# Patient Record
Sex: MALE | Race: AMERICAN INDIAN OR ALASKA NATIVE
[De-identification: names, ages, dates, MRNs, and addresses within clinical notes are randomized per-mention and may not be internally consistent; named-entity substitution may affect disease eponyms.]

---

## 2018-09-16 ENCOUNTER — HOSPITAL ENCOUNTER (EMERGENCY)
Dept: HOSPITAL 5 - ED | Age: 53
LOS: 4 days | Discharge: PSYCHIATRIC OR SUBSTANCE USE FACILITY | End: 2018-09-20
Payer: SELF-PAY

## 2018-09-16 DIAGNOSIS — F32.9: Primary | ICD-10-CM

## 2018-09-16 DIAGNOSIS — F14.10: ICD-10-CM

## 2018-09-16 LAB
BACTERIA #/AREA URNS HPF: (no result) /HPF
BASOPHILS # (AUTO): 0.2 K/MM3 (ref 0–0.1)
BASOPHILS NFR BLD AUTO: 2.8 % (ref 0–1.8)
BENZODIAZEPINES SCREEN,URINE: (no result)
BILIRUB UR QL STRIP: (no result)
BLOOD UR QL VISUAL: (no result)
BUN SERPL-MCNC: 10 MG/DL (ref 9–20)
BUN/CREAT SERPL: 8 %
CALCIUM SERPL-MCNC: 9.8 MG/DL (ref 8.4–10.2)
EOSINOPHIL # BLD AUTO: 0.2 K/MM3 (ref 0–0.4)
EOSINOPHIL NFR BLD AUTO: 2.5 % (ref 0–4.3)
HCT VFR BLD CALC: 41.5 % (ref 35.5–45.6)
HEMOLYSIS INDEX: 36
HGB BLD-MCNC: 14.6 GM/DL (ref 11.8–15.2)
LYMPHOCYTES # BLD AUTO: 2.4 K/MM3 (ref 1.2–5.4)
LYMPHOCYTES NFR BLD AUTO: 31.8 % (ref 13.4–35)
MCH RBC QN AUTO: 33 PG (ref 28–32)
MCHC RBC AUTO-ENTMCNC: 35 % (ref 32–34)
MCV RBC AUTO: 95 FL (ref 84–94)
METHADONE SCREEN,URINE: (no result)
MONOCYTES # (AUTO): 0.7 K/MM3 (ref 0–0.8)
MONOCYTES % (AUTO): 9.5 % (ref 0–7.3)
MUCOUS THREADS #/AREA URNS HPF: (no result) /HPF
OPIATE SCREEN,URINE: (no result)
PH UR STRIP: 5 [PH] (ref 5–7)
PLATELET # BLD: 438 K/MM3 (ref 140–440)
RBC # BLD AUTO: 4.38 M/MM3 (ref 3.65–5.03)
RBC #/AREA URNS HPF: 1 /HPF (ref 0–6)
UROBILINOGEN UR-MCNC: 4 MG/DL (ref ?–2)
WBC #/AREA URNS HPF: 1 /HPF (ref 0–6)

## 2018-09-16 PROCEDURE — 80307 DRUG TEST PRSMV CHEM ANLYZR: CPT

## 2018-09-16 PROCEDURE — 81001 URINALYSIS AUTO W/SCOPE: CPT

## 2018-09-16 PROCEDURE — 80048 BASIC METABOLIC PNL TOTAL CA: CPT

## 2018-09-16 PROCEDURE — G0480 DRUG TEST DEF 1-7 CLASSES: HCPCS

## 2018-09-16 PROCEDURE — 85025 COMPLETE CBC W/AUTO DIFF WBC: CPT

## 2018-09-16 PROCEDURE — 80320 DRUG SCREEN QUANTALCOHOLS: CPT

## 2018-09-16 PROCEDURE — 36415 COLL VENOUS BLD VENIPUNCTURE: CPT

## 2018-09-16 PROCEDURE — 99285 EMERGENCY DEPT VISIT HI MDM: CPT

## 2018-09-16 NOTE — EMERGENCY DEPARTMENT REPORT
ED Psych HPI





- General


Chief Complaint: Psych


Stated Complaint: MH


Time Seen by Provider: 09/16/18 11:20


Source: patient


Mode of arrival: Ambulatory


Limitations: No Limitations





- History of Present Illness


Initial Comments: 


Patient is a 52-year-old male presents emergency room with complaints of 

suicidal ideation with plan.  Patient states his plan is to cut himself in 

order to die.  Patient states his suicidal ideations have been going on for a 

week and worsening.  Patient states he started using drugs on 09/15/2018.  

Patient states she's been feeling depressed for a week.  Patient states 

depression is worsening.  Patient reports that he is having audio 

hallucinations times one week that are telling him to kill himself.  Patient 

denies homicidal ideations and visual hallucinations.  Patient denies physical 

complaints except for being hungry.  Patient denies chest pain shortness of 

breath.  Patient denies abdominal pain.  Patient denies fever or chills.





MD Complaint: suicidal ideation, feels depressed


-: Sudden


Associated Psychiatric Symptoms: depression, suicidal ideation, auditory 

hallucinations


History of same: Yes


Quality: constant


Improves With: none


Worsens With: none


Context: recent drug abuse, significant life stressor


Associated Symptoms: denies: confusion, headache, shortness of breath, nausea, 

vomiting, syncope, insomnia


Treatments Prior to Arrival: placed on mental he


If Self Harm: admits thoughts of, has plan





- Related Data


 Previous Rx's











 Medication  Instructions  Recorded  Last Taken  Type


 


Ketorolac [Toradol] 10 mg PO Q6H PRN #20 tablet 07/19/18 Unknown Rx


 


Ondansetron [Zofran Odt] 4 mg PO Q8HR PRN #14 tab.rapdis 07/19/18 Unknown Rx


 


traMADol [Ultram 50 MG tab] 50 mg PO Q4HR PRN #14 tablet 07/19/18 Unknown Rx











 Allergies











Allergy/AdvReac Type Severity Reaction Status Date / Time


 


No Known Allergies Allergy   Verified 07/19/18 11:41














ED Review of Systems


ROS: 


Stated complaint: MH


Other details as noted in HPI





Constitutional: denies: chills, fever


Eyes: denies: eye pain, eye discharge, vision change


ENT: denies: ear pain, throat pain


Respiratory: denies: cough, shortness of breath, wheezing


Cardiovascular: denies: chest pain, palpitations


Endocrine: no symptoms reported


Gastrointestinal: denies: abdominal pain, nausea, diarrhea


Genitourinary: denies: urgency, dysuria


Musculoskeletal: denies: back pain, joint swelling, arthralgia


Skin: denies: rash, lesions


Neurological: denies: headache, weakness, paresthesias


Psychiatric: depression, auditory hallucinations, suicidal thoughts.  denies: 

anxiety, visual hallucinations, homicidal thoughts


Hematological/Lymphatic: denies: easy bleeding, easy bruising





ED Past Medical Hx





- Past Medical History


Previous Medical History?: Yes


Hx Hypertension: Yes


Hx Heart Attack/AMI: Yes (2008,2009)


Hx Psychiatric Treatment: Yes (SI in late 90s)





- Surgical History


Past Surgical History?: Yes


Additional Surgical History: stent placement





- Family History


Family history: no significant





- Social History


Smoking Status: Current Every Day Smoker


Substance Use Type: Alcohol, Marijuana, Other





- Medications


Home Medications: 


 Home Medications











 Medication  Instructions  Recorded  Confirmed  Last Taken  Type


 


Ketorolac [Toradol] 10 mg PO Q6H PRN #20 tablet 07/19/18  Unknown Rx


 


Ondansetron [Zofran Odt] 4 mg PO Q8HR PRN #14 tab.rapdis 07/19/18  Unknown Rx


 


traMADol [Ultram 50 MG tab] 50 mg PO Q4HR PRN #14 tablet 07/19/18  Unknown Rx














ED Physical Exam





- General


Limitations: No Limitations


General appearance: alert, in no apparent distress





- Head


Head exam: Present: atraumatic, normocephalic





- Eye


Eye exam: Present: normal appearance





- ENT


ENT exam: Present: mucous membranes moist





- Neck


Neck exam: Present: normal inspection





- Respiratory


Respiratory exam: Present: normal lung sounds bilaterally.  Absent: respiratory 

distress





- Cardiovascular


Cardiovascular Exam: Present: regular rate, normal rhythm.  Absent: systolic 

murmur, diastolic murmur, rubs, gallop





- GI/Abdominal


GI/Abdominal exam: Present: soft, normal bowel sounds





- Rectal


Rectal exam: Present: deferred





- Extremities Exam


Extremities exam: Present: normal inspection





- Back Exam


Back exam: Present: normal inspection





- Neurological Exam


Neurological exam: Present: alert, oriented X3





- Psychiatric


Psychiatric exam: Present: depressed, suicidal ideation





- Skin


Skin exam: Present: warm, dry, intact, normal color.  Absent: rash





ED Course


 Vital Signs











  09/16/18





  02:49


 


Temperature 98.1 F


 


Pulse Rate 90


 


Respiratory 16





Rate 


 


Blood Pressure 124/92


 


O2 Sat by Pulse 98





Oximetry 














- Reevaluation(s)


Reevaluation #1: 


Patient was placed on a 1013 due to suicidal ideation with a plan.  Patient 

will remain on a psychiatric hold and medically cleared once all results are 

available.  Mental health evaluation was ordered.


09/16/18 11:30





Patient is medically cleared and patient will remain on 1013 until accepted to 

a psychiatric facility.  Plan of care and labs discussed with patient


09/16/18 15:10








ED Medical Decision Making





- Lab Data


Result diagrams: 


 09/16/18 04:37





 09/16/18 04:37





- Medical Decision Making


Patient is a 52-year-old male that presents emergency room with complaints of 

suicidal ideation with a plan.  She was placed on a 1013 immediately and will 

remain on 1013 until accepted into appropriate psychiatric facility.  Patient 

is medically cleared.








- Differential Diagnosis


DEPRESSION. SI. 


Critical care attestation.: 


If time is entered above; I have spent that time in minutes in the direct care 

of this critically ill patient, excluding procedure time.








ED Disposition


Clinical Impression: 


 Suicidal ideations, Hallucination





Depression


Qualifiers:


 Depression Type: unspecified Qualified Code(s): F32.9 - Major depressive 

disorder, single episode, unspecified





Disposition: DC/TX-[facility] PSY HOSP/PSY UNIT


Is pt being admited?: No


Does the pt Need Aspirin: No


Condition: Stable


Referrals: 


PRIMARY CARE,MD [Primary Care Provider] - 3-5 Days


Time of Disposition: 15:12

## 2018-09-18 RX ADMIN — MIRTAZAPINE SCH MG: 15 TABLET, FILM COATED ORAL at 22:04

## 2018-09-18 NOTE — CONSULTATION
History of Present Illness





- Reason for Consult


Consult date: 09/18/18


Reason for consult: Mental Health Evaluation


Requesting physician: JOSE HUDSON III





- Chief Complaint


Chief complaint: 


"I am depressed"








- History of Present Psychiatric Illness


52-year-old male presents emergency room with complaints of suicidal ideation 

with plan. Today the patient is calm and cooperative, but withdrawn during the 

assessment. He stated that his issues is "drug use." He stated having a long hx 

of substance abuse since his 20's. He stated that he think about the things he 

have lost due to his substance use. He stated that he do hear voices when he is 

high on drugs. He stated that his depression has "heightened" lately because 

his life is in t"turmoil." He didn't want to discuss his life stressors when 

asked. He endorsed SI's with a plan to stab himself in the neck. He stated that 

he has attempted suicide in the past by overdosing. He denies HI's and AVH's. 

He stated that his sleep is erratic, but denies a poor appetite. He denies 

alcohol consumption (etoh). 








Medications and Allergies


 Allergies











Allergy/AdvReac Type Severity Reaction Status Date / Time


 


No Known Allergies Allergy   Verified 07/19/18 11:41











 Home Medications











 Medication  Instructions  Recorded  Confirmed  Last Taken  Type


 


Ketorolac [Toradol] 10 mg PO Q6H PRN #20 tablet 07/19/18  Unknown Rx


 


Ondansetron [Zofran Odt] 4 mg PO Q8HR PRN #14 tab.rapdis 07/19/18  Unknown Rx


 


traMADol [Ultram 50 MG tab] 50 mg PO Q4HR PRN #14 tablet 07/19/18  Unknown Rx














Past psychiatric history





- Past Medical History


Past Medical History: acute MI, hypertension





- past Psychiatric treatment and history


psychiatric treatment history: 


Inpatient psy services in the past. Denies a fam psy hx.








- Social History


Social history: lives with family





Mental Status Exam





- Vital signs


 Last Vital Signs











Temp  98.4 F   09/18/18 08:30


 


Pulse  65   09/18/18 08:30


 


Resp  16   09/18/18 08:30


 


BP  132/97   09/18/18 08:30


 


Pulse Ox  98   09/18/18 08:30














- Exam


Narrative exam: 


MSE:





 Appearance: calm, cooperative


 Behavior: regular eye contact


 Speech: regular rate and tone  


 Mood: withdrawn, guarded "I am depressed"


 Affect: congruent to mood


 Thought Process: circumstantial 


 Thought Content: denies HI's and AVH's


 Motor Activity: ambulatory


 Cognition: A/O x 3


 Insight: variable 


 Judgment: poor   








Results


Result Diagrams: 


 09/16/18 04:37





 09/16/18 04:37


All other labs normal.








Assessment and Plan


Assessment and plan: 


Impression: MDD, Severe Type. Substance Use DO (cocaine). Cannabis Use DO. 

Today the patient is calm and cooperative, but withdrawn during the assessment. 

The patient endorses SI's with a plan.





DDx: R/O Bipolar DO, R/O Substance Induced Mood DO





Recommendation/Plan: Continue 1013 with placement to inpatient psy services. 

Start Remeron 15 mg PO HS for depression. Discussed possible suicidality/

medication induced alize with patient reference Remeron.

## 2018-09-19 RX ADMIN — MIRTAZAPINE SCH MG: 15 TABLET, FILM COATED ORAL at 22:01

## 2018-09-19 NOTE — PROGRESS NOTE
Subjective





- Reason for Consult


Consult date: 09/19/18


Reason for consult: Psychiatric Follow-up Evaluation





- Chief Complaint


Chief complaint: 


"I feel okay"





Patient is a 52-year-old male presents emergency room with complaints of 

suicidal ideation with plan. Today the patient is calm and cooperative, but 

withdrawn during the assessment. He reports that he feels better and the 

suicidal ideations have decreased. Depressed mood is triggered by long hx of " 

drug use." Currently, he denies SI/HI's, A/VH's, and delusions. 














Mental Status Exam





- Vital signs


 Last Vital Signs











Temp  98.8 F   09/19/18 09:55


 


Pulse  58 L  09/19/18 09:55


 


Resp  16   09/19/18 09:55


 


BP  145/97   09/19/18 09:55


 


Pulse Ox  100   09/19/18 09:55














- Exam


Narrative exam: 


Mental Status Exam


General Appearance: Causally Dressed-hospital gown


Eye Contact: Intermittent


Orientation: Alert and oriented x 4 ( person, place, time, and situation)


Attitude/Behavior: Cooperative


Sensorium: Clear


Psychomotor & Musculoskeletal Activity: Laying in bed


Mood: "I'm okay" Depressed


Affect: Constricted


Speech/Language: Normal rate and tone


Thought Processes: Organized


Thought Content: Reality Oriented; WNL- None elicited; patient denies delusions


Perception: WNL-patient denies A/V/T hallucinations


Concentration/Attention:  Impaired


Suicidal Ideations/Plan: Patient denies


Homicidal Ideations/Plan: Patient denies


Insight: Variable 


Judgment: Poor   











Assessment and Plan


Impression: MDD, Severe Type. Substance Use DO (cocaine). Cannabis Use DO. 

Today the patient is calm and cooperative, but withdrawn during the assessment. 

The patient denies SI/HI's, A/VH's, and delusions.  





DDx: R/O Bipolar DO, R/O Substance Induced Mood DO





Recommendation/Plan: 


1. Continue 1013 with placement to inpatient psychiatric  services. 


2. Continue Remeron 15 mg PO HS for depression. Discussed possible suicidality/

medication induced alize with patient reference Remeron.


3. Will continue to monitor mood, psychosis, sleep, appetite, compliance, and 

side effects.

## 2018-09-20 VITALS — SYSTOLIC BLOOD PRESSURE: 131 MMHG | DIASTOLIC BLOOD PRESSURE: 81 MMHG
